# Patient Record
Sex: FEMALE | URBAN - METROPOLITAN AREA
[De-identification: names, ages, dates, MRNs, and addresses within clinical notes are randomized per-mention and may not be internally consistent; named-entity substitution may affect disease eponyms.]

---

## 2023-02-21 ENCOUNTER — APPOINTMENT (RX ONLY)
Dept: URBAN - METROPOLITAN AREA CLINIC 141 | Facility: CLINIC | Age: 48
Setting detail: DERMATOLOGY
End: 2023-02-21

## 2023-02-21 DIAGNOSIS — D485 NEOPLASM OF UNCERTAIN BEHAVIOR OF SKIN: ICD-10-CM

## 2023-02-21 DIAGNOSIS — Z71.89 OTHER SPECIFIED COUNSELING: ICD-10-CM

## 2023-02-21 DIAGNOSIS — D492 NEOPLASM OF UNSPECIFIED NATURE OF BONE, SOFT TISSUE, AND SKIN: ICD-10-CM

## 2023-02-21 DIAGNOSIS — L30.9 DERMATITIS, UNSPECIFIED: ICD-10-CM

## 2023-02-21 DIAGNOSIS — D22 MELANOCYTIC NEVI: ICD-10-CM

## 2023-02-21 DIAGNOSIS — L85.3 XEROSIS CUTIS: ICD-10-CM

## 2023-02-21 DIAGNOSIS — D18.0 HEMANGIOMA: ICD-10-CM

## 2023-02-21 DIAGNOSIS — L82.1 OTHER SEBORRHEIC KERATOSIS: ICD-10-CM

## 2023-02-21 DIAGNOSIS — D49.2 NEOPLASM OF UNSPECIFIED BEHAVIOR OF BONE, SOFT TISSUE, AND SKIN: ICD-10-CM

## 2023-02-21 DIAGNOSIS — L81.4 OTHER MELANIN HYPERPIGMENTATION: ICD-10-CM

## 2023-02-21 PROBLEM — D18.01 HEMANGIOMA OF SKIN AND SUBCUTANEOUS TISSUE: Status: ACTIVE | Noted: 2023-02-21

## 2023-02-21 PROBLEM — R22.9 LOCALIZED SWELLING, MASS AND LUMP, UNSPECIFIED: Status: ACTIVE | Noted: 2023-02-21

## 2023-02-21 PROBLEM — D48.5 NEOPLASM OF UNCERTAIN BEHAVIOR OF SKIN: Status: ACTIVE | Noted: 2023-02-21

## 2023-02-21 PROBLEM — D22.5 MELANOCYTIC NEVI OF TRUNK: Status: ACTIVE | Noted: 2023-02-21

## 2023-02-21 PROCEDURE — ? OTC TREATMENT REGIMEN

## 2023-02-21 PROCEDURE — ? SUNSCREEN TREATMENT REGIMEN

## 2023-02-21 PROCEDURE — ? LAB REPORTS REVIEWED

## 2023-02-21 PROCEDURE — 11102 TANGNTL BX SKIN SINGLE LES: CPT

## 2023-02-21 PROCEDURE — ? BIOPSY BY SHAVE METHOD

## 2023-02-21 PROCEDURE — ? RECORDS REVIEWED

## 2023-02-21 PROCEDURE — ? OBSERVATION AND MEASURE

## 2023-02-21 PROCEDURE — ? ADDITIONAL NOTES

## 2023-02-21 PROCEDURE — 99203 OFFICE O/P NEW LOW 30 MIN: CPT | Mod: 25

## 2023-02-21 PROCEDURE — ? COUNSELING

## 2023-02-21 ASSESSMENT — LOCATION SIMPLE DESCRIPTION DERM
LOCATION SIMPLE: LEFT SHOULDER
LOCATION SIMPLE: RIGHT UPPER ARM
LOCATION SIMPLE: LEFT FOREARM
LOCATION SIMPLE: LEFT HAND
LOCATION SIMPLE: RIGHT ANTERIOR NECK
LOCATION SIMPLE: RIGHT FOREARM
LOCATION SIMPLE: ABDOMEN
LOCATION SIMPLE: RIGHT UPPER BACK
LOCATION SIMPLE: RIGHT FOREHEAD
LOCATION SIMPLE: RIGHT SHOULDER
LOCATION SIMPLE: RIGHT HAND
LOCATION SIMPLE: LEFT UPPER BACK

## 2023-02-21 ASSESSMENT — LOCATION DETAILED DESCRIPTION DERM
LOCATION DETAILED: LEFT DISTAL DORSAL FOREARM
LOCATION DETAILED: RIGHT LATERAL ABDOMEN
LOCATION DETAILED: LEFT POSTERIOR SHOULDER
LOCATION DETAILED: LEFT SUPERIOR UPPER BACK
LOCATION DETAILED: RIGHT PROXIMAL DORSAL FOREARM
LOCATION DETAILED: LEFT PROXIMAL DORSAL FOREARM
LOCATION DETAILED: LEFT MID-UPPER BACK
LOCATION DETAILED: RIGHT CLAVICULAR NECK
LOCATION DETAILED: RIGHT DISTAL DORSAL FOREARM
LOCATION DETAILED: SUBXIPHOID
LOCATION DETAILED: RIGHT MID-UPPER BACK
LOCATION DETAILED: RIGHT INFERIOR MEDIAL UPPER BACK
LOCATION DETAILED: RIGHT POSTERIOR SHOULDER
LOCATION DETAILED: RIGHT INFERIOR FOREHEAD
LOCATION DETAILED: RIGHT RADIAL DORSAL HAND
LOCATION DETAILED: RIGHT INFERIOR UPPER BACK
LOCATION DETAILED: RIGHT ANTERIOR DISTAL UPPER ARM
LOCATION DETAILED: LEFT RADIAL DORSAL HAND
LOCATION DETAILED: RIGHT SUPERIOR UPPER BACK

## 2023-02-21 ASSESSMENT — LOCATION ZONE DERM
LOCATION ZONE: NECK
LOCATION ZONE: TRUNK
LOCATION ZONE: ARM
LOCATION ZONE: HAND
LOCATION ZONE: FACE

## 2023-02-21 NOTE — PROCEDURE: RECORDS REVIEWED
Number Of Unique Sources Reviewed: 1
Detail Level: Detailed
Summary Of Other Records Reviewed: 2/13/23- ultrasound reported 3.6 x 1.8 cm hypoechoic macro lobulated mass with mild surrounding reactive tissue changes low level internal vascularity and posterior acoustic enhancement. Impression 3.6 macro lobulated mass in the left neck area of concern fever atypical lymph node correlate for lymphoproliferative process. Consider CT scan of the neck with contrast. \\n\\nAddendum: CT scan 2/22/23- CT scan of neck with contrast impression and large left level II B lymphadenopathy with central necrosis suspected. Recommend ultrasound guided biopsy for further evaluation. Level two measuring 1.7 x 1.4 x 2.3 cm suspicious for neoplastic involvement recommend ultrasound guided biopsy the salvilary glands appear unremarkable, there is no enhancing mass in the mouth,   no additional enlarged lymph nodes are noted. Lung Apices   appear grossly clear. Esophagus is unremarkable thyroid is not enlarged. Small lymph nodes noted in the neck elsewhere are not enlarged or otherwise suspicious. Vascular enhancement is unremarkable.

## 2023-02-21 NOTE — PROCEDURE: BIOPSY BY SHAVE METHOD

## 2023-02-21 NOTE — PROCEDURE: ADDITIONAL NOTES
Additional Notes: Ultrasound guided biopsy recommended. Patient will be speaking to her primary care physician to aid in developing a plan. I suggested two options, consultation with Dr. Ángel Balbuena at [address] [place] [address] in [address] [place] [phone] for ultrasound guided biopsy ASAP versus visiting an academic or tertiary center such as [place], [facility], or [address] [place] [address]. I discussed I will help facilitate any of these options. She will speak to her primary care doctor tomorrow and then we will collaborate and develop a plan.
Detail Level: Simple
Render Risk Assessment In Note?: no
Additional Notes: Discussed description appears to be inflammatory acne rosacea. We will develop a plan after further interventional studies are completed. I will discuss options by phone in a few weeks with her.

## 2023-04-28 ENCOUNTER — APPOINTMENT (RX ONLY)
Dept: URBAN - METROPOLITAN AREA CLINIC 141 | Facility: CLINIC | Age: 48
Setting detail: DERMATOLOGY
End: 2023-04-28

## 2023-04-28 DIAGNOSIS — D492 NEOPLASM OF UNSPECIFIED NATURE OF BONE, SOFT TISSUE, AND SKIN: ICD-10-CM

## 2023-04-28 PROBLEM — R22.9 LOCALIZED SWELLING, MASS AND LUMP, UNSPECIFIED: Status: ACTIVE | Noted: 2023-04-28

## 2023-04-28 PROCEDURE — ? RECORDS REVIEWED

## 2023-04-28 PROCEDURE — ? ADDITIONAL NOTES

## 2023-04-28 PROCEDURE — ? LAB REPORTS REVIEWED

## 2023-04-28 NOTE — PROCEDURE: ADDITIONAL NOTES
Additional Notes: 2/21/23- Ultrasound guided biopsy recommended. Patient will be speaking to her primary care physician to aid in developing a plan. I suggested two options, consultation with Dr. Charlene Avelar at [address] [place] [address] in [phone] [phone] for ultrasound guided biopsy ASAP versus visiting an academic or tertiary center such as [place], [facility], or [address] [place] [address]. I discussed I will help facilitate any of these options. She will speak to her primary care doctor tomorrow and then we will collaborate and develop a plan. \\n\\n4/28/23-after earlier discussion patient had decided to follow under the guidance of Dr. Mitul Gorman , ENT whom performed ultrasound guided fine needle biopsy on 3/6/23. 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 Chalo Barreto reports Dr. Mitul Gorman recommended surgical removal that she was scheduled for this week , however when she reported for surgery , Dr. Mitul Gorman felt nodule had significantly decreased. She is still concerned due to the conflicting reports on her scans. She presents for advise. \\n-she also had consult with pulmonologist to rule out tuberculosis, which was reportedly negative. \\n\\nPLAN: \\[facility]PART[facility] FOR SECOND OPINION AND ADVISE ON RISKS BENEFITS AND CAUTIONS OF SURGICAL REMOVAL WITH REPEAT CT SCAN.
Detail Level: Simple
Render Risk Assessment In Note?: no

## 2023-04-28 NOTE — PROCEDURE: RECORDS REVIEWED
Number Of Unique Sources Reviewed: 1
Detail Level: Detailed
Summary Of Other Records Reviewed: 2/13/23- ultrasound reported 3.6 x 1.8 cm hypoechoic macro lobulated mass with mild surrounding reactive tissue changes low level internal vascularity and posterior acoustic enhancement. Impression 3.6 macro lobulated mass in the left neck area of concern fever atypical lymph node correlate for lymphoproliferative process. Consider CT scan of the neck with contrast. \\n\\nAddendum: CT scan 2/22/23- CT scan of neck with contrast impression and large left level II B lymphadenopathy with central necrosis suspected. Recommend ultrasound guided biopsy for further evaluation. Level two measuring 1.7 x 1.4 x 2.3 cm suspicious for neoplastic involvement recommend ultrasound guided biopsy the salvilary glands appear unremarkable, there is no enhancing mass in the mouth,   no additional enlarged lymph nodes are noted. Lung Apices   appear grossly clear. Esophagus is unremarkable thyroid is not enlarged. Small lymph nodes noted in the neck elsewhere are not enlarged or otherwise suspicious. Vascular enhancement is unremarkable. \\n\\n3/6/23 ENT whom performed ultrasound guided fine needle biopsy on 3/6/23. Which revealed skeletal muscle and a portion of a favored potential diagnosis of squamous epithelial cyst with chronic inflammation and giant cells.